# Patient Record
Sex: FEMALE | Race: ASIAN | NOT HISPANIC OR LATINO
[De-identification: names, ages, dates, MRNs, and addresses within clinical notes are randomized per-mention and may not be internally consistent; named-entity substitution may affect disease eponyms.]

---

## 2018-02-02 ENCOUNTER — RESULT REVIEW (OUTPATIENT)
Age: 33
End: 2018-02-02

## 2018-08-31 PROBLEM — Z00.00 ENCOUNTER FOR PREVENTIVE HEALTH EXAMINATION: Status: ACTIVE | Noted: 2018-08-31

## 2018-09-05 ENCOUNTER — TRANSCRIPTION ENCOUNTER (OUTPATIENT)
Age: 33
End: 2018-09-05

## 2018-09-05 ENCOUNTER — APPOINTMENT (OUTPATIENT)
Dept: MATERNAL FETAL MEDICINE | Facility: CLINIC | Age: 33
End: 2018-09-05
Payer: COMMERCIAL

## 2018-09-05 PROCEDURE — G0109 DIAB MANAGE TRN IND/GROUP: CPT

## 2018-09-10 ENCOUNTER — APPOINTMENT (OUTPATIENT)
Dept: MATERNAL FETAL MEDICINE | Facility: CLINIC | Age: 33
End: 2018-09-10
Payer: COMMERCIAL

## 2018-09-10 ENCOUNTER — ASOB RESULT (OUTPATIENT)
Age: 33
End: 2018-09-10

## 2018-09-10 DIAGNOSIS — O24.919 UNSPECIFIED DIABETES MELLITUS IN PREGNANCY, UNSPECIFIED TRIMESTER: ICD-10-CM

## 2018-09-10 PROCEDURE — G0109 DIAB MANAGE TRN IND/GROUP: CPT

## 2018-09-10 RX ORDER — LANCETS 30 GAUGE
EACH MISCELLANEOUS
Qty: 2 | Refills: 2 | Status: ACTIVE | COMMUNITY
Start: 2018-09-10 | End: 1900-01-01

## 2018-09-10 RX ORDER — BLOOD SUGAR DIAGNOSTIC
STRIP MISCELLANEOUS
Qty: 2 | Refills: 6 | Status: ACTIVE | COMMUNITY
Start: 2018-09-10 | End: 1900-01-01

## 2018-09-10 RX ORDER — URINE ACETONE TEST STRIPS
STRIP MISCELLANEOUS
Qty: 1 | Refills: 1 | Status: ACTIVE | COMMUNITY
Start: 2018-09-10 | End: 1900-01-01

## 2018-09-11 RX ORDER — BLOOD-GLUCOSE METER
KIT MISCELLANEOUS
Qty: 1 | Refills: 0 | Status: ACTIVE | COMMUNITY
Start: 2018-09-11 | End: 1900-01-01

## 2018-09-11 RX ORDER — LANCETS 28 GAUGE
EACH MISCELLANEOUS
Qty: 2 | Refills: 6 | Status: COMPLETED | COMMUNITY
Start: 2018-09-11 | End: 2019-09-11

## 2018-09-11 RX ORDER — BLOOD SUGAR DIAGNOSTIC
STRIP MISCELLANEOUS
Qty: 2 | Refills: 6 | Status: COMPLETED | COMMUNITY
Start: 2018-09-11 | End: 2019-09-11

## 2018-09-21 ENCOUNTER — APPOINTMENT (OUTPATIENT)
Dept: MATERNAL FETAL MEDICINE | Facility: CLINIC | Age: 33
End: 2018-09-21
Payer: COMMERCIAL

## 2018-09-21 ENCOUNTER — ASOB RESULT (OUTPATIENT)
Age: 33
End: 2018-09-21

## 2018-09-21 PROCEDURE — G0108 DIAB MANAGE TRN  PER INDIV: CPT

## 2018-09-28 ENCOUNTER — APPOINTMENT (OUTPATIENT)
Dept: MATERNAL FETAL MEDICINE | Facility: CLINIC | Age: 33
End: 2018-09-28
Payer: COMMERCIAL

## 2018-09-28 ENCOUNTER — ASOB RESULT (OUTPATIENT)
Age: 33
End: 2018-09-28

## 2018-09-28 PROCEDURE — G0108 DIAB MANAGE TRN  PER INDIV: CPT

## 2018-09-28 RX ORDER — ISOPROPYL ALCOHOL 0.7 ML/ML
SWAB TOPICAL
Qty: 1 | Refills: 2 | Status: ACTIVE | COMMUNITY
Start: 2018-09-28 | End: 1900-01-01

## 2018-09-29 RX ORDER — INSULIN HUMAN 100 [IU]/ML
100 INJECTION, SUSPENSION SUBCUTANEOUS
Qty: 1 | Refills: 1 | Status: ACTIVE | COMMUNITY
Start: 2018-09-28 | End: 1900-01-01

## 2018-09-29 RX ORDER — PEN NEEDLE, DIABETIC 29 G X1/2"
31G X 5 MM NEEDLE, DISPOSABLE MISCELLANEOUS
Qty: 1 | Refills: 0 | Status: ACTIVE | COMMUNITY
Start: 2018-09-28 | End: 1900-01-01

## 2018-10-02 ENCOUNTER — TRANSCRIPTION ENCOUNTER (OUTPATIENT)
Age: 33
End: 2018-10-02

## 2018-10-10 ENCOUNTER — APPOINTMENT (OUTPATIENT)
Dept: MATERNAL FETAL MEDICINE | Facility: CLINIC | Age: 33
End: 2018-10-10
Payer: COMMERCIAL

## 2018-10-10 ENCOUNTER — ASOB RESULT (OUTPATIENT)
Age: 33
End: 2018-10-10

## 2018-10-10 PROCEDURE — G0108 DIAB MANAGE TRN  PER INDIV: CPT

## 2018-10-26 ENCOUNTER — APPOINTMENT (OUTPATIENT)
Dept: MATERNAL FETAL MEDICINE | Facility: CLINIC | Age: 33
End: 2018-10-26
Payer: COMMERCIAL

## 2018-10-26 ENCOUNTER — ASOB RESULT (OUTPATIENT)
Age: 33
End: 2018-10-26

## 2018-10-26 PROCEDURE — G0108 DIAB MANAGE TRN  PER INDIV: CPT

## 2018-11-05 ENCOUNTER — OUTPATIENT (OUTPATIENT)
Dept: OUTPATIENT SERVICES | Facility: HOSPITAL | Age: 33
LOS: 1 days | End: 2018-11-05
Payer: COMMERCIAL

## 2018-11-05 DIAGNOSIS — O26.899 OTHER SPECIFIED PREGNANCY RELATED CONDITIONS, UNSPECIFIED TRIMESTER: ICD-10-CM

## 2018-11-05 DIAGNOSIS — Z3A.00 WEEKS OF GESTATION OF PREGNANCY NOT SPECIFIED: ICD-10-CM

## 2018-11-05 LAB — GLUCOSE BLDC GLUCOMTR-MCNC: 87 MG/DL — SIGNIFICANT CHANGE UP (ref 70–99)

## 2018-11-05 PROCEDURE — 82962 GLUCOSE BLOOD TEST: CPT

## 2018-11-05 PROCEDURE — G0463: CPT

## 2018-11-05 PROCEDURE — 59025 FETAL NON-STRESS TEST: CPT

## 2018-11-14 ENCOUNTER — INPATIENT (INPATIENT)
Facility: HOSPITAL | Age: 33
LOS: 3 days | Discharge: HOME | End: 2018-11-18
Attending: OBSTETRICS & GYNECOLOGY | Admitting: OBSTETRICS & GYNECOLOGY
Payer: COMMERCIAL

## 2018-11-14 ENCOUNTER — TRANSCRIPTION ENCOUNTER (OUTPATIENT)
Age: 33
End: 2018-11-14

## 2018-11-14 VITALS — WEIGHT: 169.76 LBS | HEIGHT: 60 IN

## 2018-11-14 DIAGNOSIS — Z79.4 LONG TERM (CURRENT) USE OF INSULIN: ICD-10-CM

## 2018-11-14 LAB
BASOPHILS # BLD AUTO: 0.1 K/UL — SIGNIFICANT CHANGE UP (ref 0–0.2)
BASOPHILS NFR BLD AUTO: 0.8 % — SIGNIFICANT CHANGE UP (ref 0–2)
EOSINOPHIL # BLD AUTO: 0.1 K/UL — SIGNIFICANT CHANGE UP (ref 0–0.5)
EOSINOPHIL NFR BLD AUTO: 0.8 % — SIGNIFICANT CHANGE UP (ref 0–6)
GLUCOSE BLDC GLUCOMTR-MCNC: 109 MG/DL — HIGH (ref 70–99)
HCT VFR BLD CALC: 38.2 % — SIGNIFICANT CHANGE UP (ref 34.5–45)
HGB BLD-MCNC: 12.5 G/DL — SIGNIFICANT CHANGE UP (ref 11.5–15.5)
LYMPHOCYTES # BLD AUTO: 1.8 K/UL — SIGNIFICANT CHANGE UP (ref 1–3.3)
LYMPHOCYTES # BLD AUTO: 21.7 % — SIGNIFICANT CHANGE UP (ref 13–44)
MCHC RBC-ENTMCNC: 23.7 PG — LOW (ref 27–34)
MCHC RBC-ENTMCNC: 32.6 GM/DL — SIGNIFICANT CHANGE UP (ref 32–36)
MCV RBC AUTO: 72.8 FL — LOW (ref 80–100)
MONOCYTES # BLD AUTO: 0.7 K/UL — SIGNIFICANT CHANGE UP (ref 0–0.9)
MONOCYTES NFR BLD AUTO: 8.7 % — SIGNIFICANT CHANGE UP (ref 2–14)
NEUTROPHILS # BLD AUTO: 5.6 K/UL — SIGNIFICANT CHANGE UP (ref 1.8–7.4)
NEUTROPHILS NFR BLD AUTO: 68 % — SIGNIFICANT CHANGE UP (ref 43–77)
PLATELET # BLD AUTO: 322 K/UL — SIGNIFICANT CHANGE UP (ref 150–400)
RBC # BLD: 5.25 M/UL — HIGH (ref 3.8–5.2)
RBC # FLD: 17.9 % — HIGH (ref 10.3–14.5)
WBC # BLD: 8.2 K/UL — SIGNIFICANT CHANGE UP (ref 3.8–10.5)
WBC # FLD AUTO: 8.2 K/UL — SIGNIFICANT CHANGE UP (ref 3.8–10.5)

## 2018-11-14 RX ORDER — SODIUM CHLORIDE 9 MG/ML
500 INJECTION, SOLUTION INTRAVENOUS ONCE
Qty: 0 | Refills: 0 | Status: DISCONTINUED | OUTPATIENT
Start: 2018-11-14 | End: 2018-11-15

## 2018-11-14 RX ORDER — CITRIC ACID/SODIUM CITRATE 300-500 MG
15 SOLUTION, ORAL ORAL EVERY 4 HOURS
Qty: 0 | Refills: 0 | Status: DISCONTINUED | OUTPATIENT
Start: 2018-11-14 | End: 2018-11-15

## 2018-11-14 RX ORDER — SODIUM CHLORIDE 9 MG/ML
1000 INJECTION INTRAMUSCULAR; INTRAVENOUS; SUBCUTANEOUS
Qty: 0 | Refills: 0 | Status: DISCONTINUED | OUTPATIENT
Start: 2018-11-14 | End: 2018-11-15

## 2018-11-14 RX ORDER — OXYTOCIN 10 UNIT/ML
333.33 VIAL (ML) INJECTION
Qty: 20 | Refills: 0 | Status: COMPLETED | OUTPATIENT
Start: 2018-11-14

## 2018-11-14 RX ORDER — SODIUM CHLORIDE 9 MG/ML
1000 INJECTION, SOLUTION INTRAVENOUS
Qty: 0 | Refills: 0 | Status: DISCONTINUED | OUTPATIENT
Start: 2018-11-14 | End: 2018-11-15

## 2018-11-14 RX ADMIN — SODIUM CHLORIDE 125 MILLILITER(S): 9 INJECTION INTRAMUSCULAR; INTRAVENOUS; SUBCUTANEOUS at 23:20

## 2018-11-15 ENCOUNTER — RESULT REVIEW (OUTPATIENT)
Age: 33
End: 2018-11-15

## 2018-11-15 LAB
BLD GP AB SCN SERPL QL: NEGATIVE — SIGNIFICANT CHANGE UP
GLUCOSE BLDC GLUCOMTR-MCNC: 118 MG/DL — HIGH (ref 70–99)
GLUCOSE BLDC GLUCOMTR-MCNC: 95 MG/DL — SIGNIFICANT CHANGE UP (ref 70–99)
RH IG SCN BLD-IMP: POSITIVE — SIGNIFICANT CHANGE UP
RH IG SCN BLD-IMP: POSITIVE — SIGNIFICANT CHANGE UP
T PALLIDUM AB TITR SER: NEGATIVE — SIGNIFICANT CHANGE UP

## 2018-11-15 PROCEDURE — 88307 TISSUE EXAM BY PATHOLOGIST: CPT | Mod: 26

## 2018-11-15 RX ORDER — SODIUM CHLORIDE 9 MG/ML
1000 INJECTION, SOLUTION INTRAVENOUS
Qty: 0 | Refills: 0 | Status: DISCONTINUED | OUTPATIENT
Start: 2018-11-15 | End: 2018-11-18

## 2018-11-15 RX ORDER — FERROUS SULFATE 325(65) MG
325 TABLET ORAL DAILY
Qty: 0 | Refills: 0 | Status: DISCONTINUED | OUTPATIENT
Start: 2018-11-15 | End: 2018-11-18

## 2018-11-15 RX ORDER — GLYCERIN ADULT
1 SUPPOSITORY, RECTAL RECTAL AT BEDTIME
Qty: 0 | Refills: 0 | Status: DISCONTINUED | OUTPATIENT
Start: 2018-11-15 | End: 2018-11-18

## 2018-11-15 RX ORDER — OXYCODONE HYDROCHLORIDE 5 MG/1
5 TABLET ORAL
Qty: 0 | Refills: 0 | Status: COMPLETED | OUTPATIENT
Start: 2018-11-15 | End: 2018-11-22

## 2018-11-15 RX ORDER — IBUPROFEN 200 MG
600 TABLET ORAL EVERY 6 HOURS
Qty: 0 | Refills: 0 | Status: COMPLETED | OUTPATIENT
Start: 2018-11-15 | End: 2019-10-14

## 2018-11-15 RX ORDER — OXYTOCIN 10 UNIT/ML
333.33 VIAL (ML) INJECTION
Qty: 20 | Refills: 0 | Status: DISCONTINUED | OUTPATIENT
Start: 2018-11-15 | End: 2018-11-18

## 2018-11-15 RX ORDER — KETOROLAC TROMETHAMINE 30 MG/ML
30 SYRINGE (ML) INJECTION EVERY 6 HOURS
Qty: 0 | Refills: 0 | Status: DISCONTINUED | OUTPATIENT
Start: 2018-11-15 | End: 2018-11-16

## 2018-11-15 RX ORDER — ACETAMINOPHEN 500 MG
975 TABLET ORAL EVERY 6 HOURS
Qty: 0 | Refills: 0 | Status: DISCONTINUED | OUTPATIENT
Start: 2018-11-15 | End: 2018-11-18

## 2018-11-15 RX ORDER — DEXAMETHASONE 0.5 MG/5ML
4 ELIXIR ORAL EVERY 6 HOURS
Qty: 0 | Refills: 0 | Status: DISCONTINUED | OUTPATIENT
Start: 2018-11-15 | End: 2018-11-17

## 2018-11-15 RX ORDER — LANOLIN
1 OINTMENT (GRAM) TOPICAL
Qty: 0 | Refills: 0 | Status: DISCONTINUED | OUTPATIENT
Start: 2018-11-15 | End: 2018-11-18

## 2018-11-15 RX ORDER — NALOXONE HYDROCHLORIDE 4 MG/.1ML
0.1 SPRAY NASAL
Qty: 0 | Refills: 0 | Status: DISCONTINUED | OUTPATIENT
Start: 2018-11-15 | End: 2018-11-17

## 2018-11-15 RX ORDER — DOCUSATE SODIUM 100 MG
100 CAPSULE ORAL
Qty: 0 | Refills: 0 | Status: DISCONTINUED | OUTPATIENT
Start: 2018-11-15 | End: 2018-11-18

## 2018-11-15 RX ORDER — SIMETHICONE 80 MG/1
80 TABLET, CHEWABLE ORAL EVERY 4 HOURS
Qty: 0 | Refills: 0 | Status: DISCONTINUED | OUTPATIENT
Start: 2018-11-15 | End: 2018-11-18

## 2018-11-15 RX ORDER — OXYTOCIN 10 UNIT/ML
41.67 VIAL (ML) INJECTION
Qty: 20 | Refills: 0 | Status: DISCONTINUED | OUTPATIENT
Start: 2018-11-15 | End: 2018-11-18

## 2018-11-15 RX ORDER — TETANUS TOXOID, REDUCED DIPHTHERIA TOXOID AND ACELLULAR PERTUSSIS VACCINE, ADSORBED 5; 2.5; 8; 8; 2.5 [IU]/.5ML; [IU]/.5ML; UG/.5ML; UG/.5ML; UG/.5ML
0.5 SUSPENSION INTRAMUSCULAR ONCE
Qty: 0 | Refills: 0 | Status: DISCONTINUED | OUTPATIENT
Start: 2018-11-15 | End: 2018-11-18

## 2018-11-15 RX ORDER — DIPHENHYDRAMINE HCL 50 MG
25 CAPSULE ORAL EVERY 6 HOURS
Qty: 0 | Refills: 0 | Status: DISCONTINUED | OUTPATIENT
Start: 2018-11-15 | End: 2018-11-18

## 2018-11-15 RX ORDER — OXYCODONE HYDROCHLORIDE 5 MG/1
5 TABLET ORAL EVERY 4 HOURS
Qty: 0 | Refills: 0 | Status: COMPLETED | OUTPATIENT
Start: 2018-11-15 | End: 2018-11-22

## 2018-11-15 RX ORDER — ONDANSETRON 8 MG/1
4 TABLET, FILM COATED ORAL EVERY 6 HOURS
Qty: 0 | Refills: 0 | Status: DISCONTINUED | OUTPATIENT
Start: 2018-11-15 | End: 2018-11-17

## 2018-11-15 RX ORDER — HEPARIN SODIUM 5000 [USP'U]/ML
5000 INJECTION INTRAVENOUS; SUBCUTANEOUS EVERY 12 HOURS
Qty: 0 | Refills: 0 | Status: DISCONTINUED | OUTPATIENT
Start: 2018-11-15 | End: 2018-11-18

## 2018-11-15 RX ADMIN — SODIUM CHLORIDE 125 MILLILITER(S): 9 INJECTION, SOLUTION INTRAVENOUS at 18:21

## 2018-11-15 RX ADMIN — Medication 30 MILLIGRAM(S): at 22:50

## 2018-11-15 RX ADMIN — SODIUM CHLORIDE 125 MILLILITER(S): 9 INJECTION, SOLUTION INTRAVENOUS at 05:27

## 2018-11-15 RX ADMIN — HEPARIN SODIUM 5000 UNIT(S): 5000 INJECTION INTRAVENOUS; SUBCUTANEOUS at 22:22

## 2018-11-15 RX ADMIN — Medication 15 MILLILITER(S): at 07:49

## 2018-11-15 RX ADMIN — Medication 30 MILLIGRAM(S): at 22:22

## 2018-11-16 LAB
HCT VFR BLD CALC: 32 % — LOW (ref 34.5–45)
HGB BLD-MCNC: 9.8 G/DL — LOW (ref 11.5–15.5)
MCHC RBC-ENTMCNC: 22.5 PG — LOW (ref 27–34)
MCHC RBC-ENTMCNC: 30.5 GM/DL — LOW (ref 32–36)
MCV RBC AUTO: 73.9 FL — LOW (ref 80–100)
PLATELET # BLD AUTO: 273 K/UL — SIGNIFICANT CHANGE UP (ref 150–400)
RBC # BLD: 4.33 M/UL — SIGNIFICANT CHANGE UP (ref 3.8–5.2)
RBC # FLD: 17.7 % — HIGH (ref 10.3–14.5)
WBC # BLD: 12.3 K/UL — HIGH (ref 3.8–10.5)
WBC # FLD AUTO: 12.3 K/UL — HIGH (ref 3.8–10.5)

## 2018-11-16 RX ORDER — KETOROLAC TROMETHAMINE 30 MG/ML
30 SYRINGE (ML) INJECTION EVERY 6 HOURS
Qty: 0 | Refills: 0 | Status: DISCONTINUED | OUTPATIENT
Start: 2018-11-16 | End: 2018-11-17

## 2018-11-16 RX ORDER — KETOROLAC TROMETHAMINE 30 MG/ML
30 SYRINGE (ML) INJECTION ONCE
Qty: 0 | Refills: 0 | Status: DISCONTINUED | OUTPATIENT
Start: 2018-11-16 | End: 2018-11-16

## 2018-11-16 RX ADMIN — Medication 30 MILLIGRAM(S): at 21:13

## 2018-11-16 RX ADMIN — SODIUM CHLORIDE 125 MILLILITER(S): 9 INJECTION, SOLUTION INTRAVENOUS at 18:31

## 2018-11-16 RX ADMIN — Medication 30 MILLIGRAM(S): at 21:20

## 2018-11-16 RX ADMIN — Medication 30 MILLIGRAM(S): at 03:59

## 2018-11-16 RX ADMIN — HEPARIN SODIUM 5000 UNIT(S): 5000 INJECTION INTRAVENOUS; SUBCUTANEOUS at 09:46

## 2018-11-16 RX ADMIN — HEPARIN SODIUM 5000 UNIT(S): 5000 INJECTION INTRAVENOUS; SUBCUTANEOUS at 21:13

## 2018-11-16 RX ADMIN — Medication 30 MILLIGRAM(S): at 09:50

## 2018-11-16 RX ADMIN — Medication 975 MILLIGRAM(S): at 21:20

## 2018-11-16 RX ADMIN — Medication 975 MILLIGRAM(S): at 20:58

## 2018-11-16 RX ADMIN — Medication 325 MILLIGRAM(S): at 09:50

## 2018-11-16 RX ADMIN — SODIUM CHLORIDE 125 MILLILITER(S): 9 INJECTION, SOLUTION INTRAVENOUS at 09:45

## 2018-11-16 RX ADMIN — Medication 30 MILLIGRAM(S): at 04:30

## 2018-11-16 RX ADMIN — Medication 30 MILLIGRAM(S): at 10:20

## 2018-11-16 RX ADMIN — Medication 1 TABLET(S): at 09:46

## 2018-11-16 NOTE — DIETITIAN INITIAL EVALUATION ADULT. - ADHERENCE
Pt followed a consistent CHO diet and took insulin at bedtime for glycemic control during pregnancy. Confirms food allergy to shellfish. Denies any GI distress./good

## 2018-11-16 NOTE — PROGRESS NOTE ADULT - SUBJECTIVE AND OBJECTIVE BOX
OB Progress Note:  Delivery, POD#1    S: 32yo POD#1 s/p LTCS . Her pain is well controlled. She is tolerating a regular diet and passing flatus. Denies N/V. Denies CP/SOB/lightheadedness/dizziness. She is ambulating without difficulty. Voiding spontanously.     O:   Vital Signs Last 24 Hrs  T(C): 36.9 (2018 06:31), Max: 37.3 (15 Nov 2018 21:21)  T(F): 98.5 (2018 06:31), Max: 99.1 (15 Nov 2018 21:21)  HR: 68 (2018 06:31) (68 - 86)  BP: 105/65 (2018 06:31) (95/56 - 131/64)  BP(mean): 92 (15 Nov 2018 15:50) (85 - 92)  RR: 18 (2018 06:31) (14 - 20)  SpO2: 96% (15 Nov 2018 21:21) (94% - 98%)    Labs:  Blood type: O Positive  Rubella IgG: RPR: Negative                          9.8<L>   12.3<H> >-----------< 273    ( 16 @ 06:49 )             32.0<L>                        12.5   8.2 >-----------< 322    ( 14 @ 23:46 )             38.2        PE:  General: NAD  Abdomen: Mildly distended, appropriately tender, incision c/d/i.  Extremities: No erythema, no pitting edema

## 2018-11-16 NOTE — PROGRESS NOTE ADULT - ATTENDING COMMENTS
I have seen and examined this patient.  I agree with the above assessment and plan.  Continue with current care.    Cristina Brothers MD

## 2018-11-16 NOTE — DIETITIAN INITIAL EVALUATION ADULT. - ENERGY NEEDS
ht: 60 inches, pre pregnancy wt: 140 pounds, prepregnancy BMI: 27.3 kg/m2, IBW: 100 pounds (+/- 10%)  Edema: none noted. Skin per nursing documentation: surgical incision noted.

## 2018-11-16 NOTE — PROGRESS NOTE ADULT - SUBJECTIVE AND OBJECTIVE BOX
Day 1 of Anesthesia Pain Management Service    SUBJECTIVE: I'm okay  Pain Scale Score:    [X] Refer to charted pain scores    THERAPY: Epidural Bupivacaine 0.01 % and Fentanyl 3 micrograms/mL     Demand Dose: 3 mL  Lockout: 15 minutes   Continuous Rate:  10 mL    MEDICATIONS  (STANDING):  acetaminophen   Tablet .. 975 milliGRAM(s) Oral every 6 hours  diphtheria/tetanus/pertussis (acellular) Vaccine (ADAcel) 0.5 milliLiter(s) IntraMuscular once  fentaNYL (3 MICROgram(s)/mL) + BUpivacaine 0.01% in 0.9% Sodium Chloride PCEA 250 milliLiter(s) Epidural PCA Continuous  ferrous    sulfate 325 milliGRAM(s) Oral daily  heparin  Injectable 5000 Unit(s) SubCutaneous every 12 hours  ibuprofen  Tablet. 600 milliGRAM(s) Oral every 6 hours  ketorolac   Injectable 30 milliGRAM(s) IV Push every 6 hours  ketorolac   Injectable 30 milliGRAM(s) IV Push once  lactated ringers. 1000 milliLiter(s) (125 mL/Hr) IV Continuous <Continuous>  misoprostol Oral Solution 60 MICROGram(s) Oral every 2 hours  oxyCODONE    IR 5 milliGRAM(s) Oral every 3 hours  oxytocin Infusion 333.333 milliUNIT(s)/Min (1000 mL/Hr) IV Continuous <Continuous>  oxytocin Infusion 41.667 milliUNIT(s)/Min (125 mL/Hr) IV Continuous <Continuous>  prenatal multivitamin 1 Tablet(s) Oral daily    MEDICATIONS  (PRN):  dexamethasone  Injectable 4 milliGRAM(s) IV Push every 6 hours PRN Nausea, IF ondansetron is ineffective after 30 - 60 minutes  diphenhydrAMINE 25 milliGRAM(s) Oral every 6 hours PRN Itching  docusate sodium 100 milliGRAM(s) Oral two times a day PRN Stool Softening  fentaNYL (3 MICROgram(s)/mL) + BUpivacaine 0.01% in 0.9% Sodium Chloride PCEA Rescue Clinician Bolus 5 milliLiter(s) Epidural every 15 minutes PRN Moderate Pain (4 - 6)  glycerin Suppository - Adult 1 Suppository(s) Rectal at bedtime PRN Constipation  lanolin Ointment 1 Application(s) Topical every 3 hours PRN Sore Nipples  naloxone Injectable 0.1 milliGRAM(s) IV Push every 3 minutes PRN For ANY of the following changes in patient status:  A. RR LESS THAN 10 breaths per minute, B. Oxygen saturation LESS THAN 90%, C. Sedation score of 6  ondansetron Injectable 4 milliGRAM(s) IV Push every 6 hours PRN Nausea  oxyCODONE    IR 5 milliGRAM(s) Oral every 4 hours PRN Severe Pain (7 - 10)  simethicone 80 milliGRAM(s) Chew every 4 hours PRN Gas      OBJECTIVE:    Assessment of Epidural Catheter Site: 	    [X] Dressing intact	[X] Site non-tender	[X] Site without erythema, discharge, edema  [X] Epidural tubing and connection checked	[X] Gross neurological exam within normal limits  [ ] Catheter removed – tip intact		                          9.8    12.3  )-----------( 273      ( 16 Nov 2018 06:49 )             32.0     Vital Signs Last 24 Hrs  T(C): 36.6 (11-16-18 @ 07:31), Max: 37.3 (11-15-18 @ 21:21)  T(F): 97.9 (11-16-18 @ 07:31), Max: 99.1 (11-15-18 @ 21:21)  HR: 69 (11-16-18 @ 07:31) (68 - 86)  BP: 112/74 (11-16-18 @ 07:31) (95/56 - 131/64)  BP(mean): 92 (11-15-18 @ 15:50) (85 - 92)  RR: 19 (11-16-18 @ 07:31) (14 - 20)  SpO2: 96% (11-16-18 @ 07:31) (94% - 98%)      Sedation Score:	[X] Alert	[ ] Drowsy	[ ] Arousable  [ ] Asleep  [ ] Unresponsive    Side Effects:	[X] None	[ ] Nausea	[ ] Vomiting  [ ] Pruritus  		[ ] Weakness  [ ] Numbness  [ ] Other:    ASSESSMENT/ PLAN:    Therapy:                         [X] Continue   [ ] Discontinue   [ ] Change to PRN Analgesics    Documentation and Verification of current medications:  [X] Done	[ ] Not done, not eligible, reason:    Comments:

## 2018-11-16 NOTE — DIETITIAN INITIAL EVALUATION ADULT. - OTHER INFO
Nutrition consult received for postpartum GDM. Pt is a 34 y/o  s/p cesarian delivery at 39 weeks gestation. Plans on breast and bottle feeding . Pt reports good appetite, denies any nausea/vomiting. No BM since delivery.

## 2018-11-17 ENCOUNTER — TRANSCRIPTION ENCOUNTER (OUTPATIENT)
Age: 33
End: 2018-11-17

## 2018-11-17 RX ORDER — IBUPROFEN 200 MG
1 TABLET ORAL
Qty: 0 | Refills: 0 | COMMUNITY
Start: 2018-11-17

## 2018-11-17 RX ORDER — OXYCODONE HYDROCHLORIDE 5 MG/1
5 TABLET ORAL
Qty: 0 | Refills: 0 | Status: DISCONTINUED | OUTPATIENT
Start: 2018-11-17 | End: 2018-11-18

## 2018-11-17 RX ORDER — IBUPROFEN 200 MG
600 TABLET ORAL EVERY 6 HOURS
Qty: 0 | Refills: 0 | Status: DISCONTINUED | OUTPATIENT
Start: 2018-11-17 | End: 2018-11-18

## 2018-11-17 RX ORDER — ACETAMINOPHEN 500 MG
3 TABLET ORAL
Qty: 0 | Refills: 0 | COMMUNITY
Start: 2018-11-17

## 2018-11-17 RX ORDER — DOCUSATE SODIUM 100 MG
1 CAPSULE ORAL
Qty: 0 | Refills: 0 | COMMUNITY
Start: 2018-11-17

## 2018-11-17 RX ORDER — FERROUS SULFATE 325(65) MG
1 TABLET ORAL
Qty: 90 | Refills: 1 | OUTPATIENT
Start: 2018-11-17 | End: 2019-01-15

## 2018-11-17 RX ORDER — OXYCODONE HYDROCHLORIDE 5 MG/1
1 TABLET ORAL
Qty: 30 | Refills: 0 | OUTPATIENT
Start: 2018-11-17

## 2018-11-17 RX ORDER — OXYCODONE HYDROCHLORIDE 5 MG/1
5 TABLET ORAL EVERY 4 HOURS
Qty: 0 | Refills: 0 | Status: DISCONTINUED | OUTPATIENT
Start: 2018-11-17 | End: 2018-11-18

## 2018-11-17 RX ADMIN — Medication 600 MILLIGRAM(S): at 18:21

## 2018-11-17 RX ADMIN — Medication 975 MILLIGRAM(S): at 21:48

## 2018-11-17 RX ADMIN — HEPARIN SODIUM 5000 UNIT(S): 5000 INJECTION INTRAVENOUS; SUBCUTANEOUS at 21:48

## 2018-11-17 RX ADMIN — Medication 600 MILLIGRAM(S): at 12:45

## 2018-11-17 RX ADMIN — Medication 975 MILLIGRAM(S): at 10:05

## 2018-11-17 RX ADMIN — Medication 975 MILLIGRAM(S): at 03:24

## 2018-11-17 RX ADMIN — Medication 975 MILLIGRAM(S): at 16:26

## 2018-11-17 RX ADMIN — Medication 975 MILLIGRAM(S): at 03:50

## 2018-11-17 RX ADMIN — Medication 600 MILLIGRAM(S): at 12:14

## 2018-11-17 RX ADMIN — Medication 975 MILLIGRAM(S): at 09:36

## 2018-11-17 RX ADMIN — Medication 100 MILLIGRAM(S): at 21:48

## 2018-11-17 RX ADMIN — Medication 1 TABLET(S): at 12:14

## 2018-11-17 RX ADMIN — Medication 30 MILLIGRAM(S): at 03:23

## 2018-11-17 RX ADMIN — Medication 325 MILLIGRAM(S): at 12:14

## 2018-11-17 RX ADMIN — Medication 975 MILLIGRAM(S): at 15:55

## 2018-11-17 RX ADMIN — Medication 30 MILLIGRAM(S): at 03:50

## 2018-11-17 RX ADMIN — HEPARIN SODIUM 5000 UNIT(S): 5000 INJECTION INTRAVENOUS; SUBCUTANEOUS at 09:37

## 2018-11-17 RX ADMIN — Medication 600 MILLIGRAM(S): at 18:50

## 2018-11-17 RX ADMIN — Medication 975 MILLIGRAM(S): at 22:20

## 2018-11-17 NOTE — DISCHARGE NOTE OB - MEDICATION SUMMARY - MEDICATIONS TO STOP TAKING
I will STOP taking the medications listed below when I get home from the hospital:    HumuLIN N KwikPen 100 units/mL subcutaneous suspension  -- 10 unit(s) subcutaneous once a day (at bedtime)   -- Do not drink alcoholic beverages when taking this medication.  It is very important that you take or use this exactly as directed.  Do not skip doses or discontinue unless directed by your doctor.  Keep in refrigerator.  Do not freeze.

## 2018-11-17 NOTE — PROGRESS NOTE ADULT - PROBLEM SELECTOR PLAN 1
- Continue regular diet.  - Increase ambulation.  - D/c PCEA today and transition to PO pain medication with motrin, tylenol and oxycodone PRN.

## 2018-11-17 NOTE — DISCHARGE NOTE OB - PATIENT PORTAL LINK FT
You can access the [facility] Patient Portal, offered by [facility], by registering with the following website: [email or web address]

## 2018-11-17 NOTE — PROGRESS NOTE ADULT - ATTENDING COMMENTS
Pt seen and examined by me. Meeting all POD 2 milestones. VSS and exam wnl.  If pt remains stable will d/c home tomorrow.     RESHMA Grimes MD  [phone]

## 2018-11-17 NOTE — DISCHARGE NOTE OB - MEDICATION SUMMARY - MEDICATIONS TO TAKE
I will START or STAY ON the medications listed below when I get home from the hospital:    ibuprofen 600 mg oral tablet  -- 1 tab(s) by mouth every 6 hours  -- Indication: For  delivery delivered    acetaminophen 325 mg oral tablet  -- 3 tab(s) by mouth every 6 hours  -- Indication: For  delivery delivered    oxyCODONE 5 mg oral tablet  -- 1 tab(s) by mouth every 3 hours, As Needed -for severe pain MDD:6 tabs   -- Indication: For  delivery delivered    Prenatal Multivitamins with Folic Acid 1 mg oral tablet  -- 1 tab(s) by mouth once a day  -- Indication: For  delivery delivered    FeroSul 325 mg (65 mg elemental iron) oral tablet  -- 1 tab(s) by mouth 3 times a day  -- Indication: For  delivery delivered    docusate sodium 100 mg oral capsule  -- 1 cap(s) by mouth 2 times a day, As needed, Stool Softening  -- Indication: For  delivery delivered

## 2018-11-17 NOTE — PROGRESS NOTE ADULT - SUBJECTIVE AND OBJECTIVE BOX
Day __2_ of Anesthesia Pain Management Service    SUBJECTIVE:  Pain Scale Score	At rest: ___ 	With Activity: ___ 	[x  ] Refer to charted pain scores    THERAPY:  [ ] Epidural Bupivacaine 0.0625% and Hydromorphone 10 micrograms/mL  [ ] Epidural Ropivacaine 0.2% plain   [x ] Epidural Bupivacaine 0.01 % and Fentanyl 3 micrograms/mL  (OB)    Demand dose _3ml__ lockout _15__ (minutes) Continuous Rate _10__       MEDICATIONS  (STANDING):  acetaminophen   Tablet .. 975 milliGRAM(s) Oral every 6 hours  diphtheria/tetanus/pertussis (acellular) Vaccine (ADAcel) 0.5 milliLiter(s) IntraMuscular once  fentaNYL (3 MICROgram(s)/mL) + BUpivacaine 0.01% in 0.9% Sodium Chloride PCEA 250 milliLiter(s) Epidural PCA Continuous  ferrous    sulfate 325 milliGRAM(s) Oral daily  heparin  Injectable 5000 Unit(s) SubCutaneous every 12 hours  ibuprofen  Tablet. 600 milliGRAM(s) Oral every 6 hours  ketorolac   Injectable 30 milliGRAM(s) IV Push every 6 hours  lactated ringers. 1000 milliLiter(s) (125 mL/Hr) IV Continuous <Continuous>  misoprostol Oral Solution 60 MICROGram(s) Oral every 2 hours  oxyCODONE    IR 5 milliGRAM(s) Oral every 3 hours  oxytocin Infusion 333.333 milliUNIT(s)/Min (1000 mL/Hr) IV Continuous <Continuous>  oxytocin Infusion 41.667 milliUNIT(s)/Min (125 mL/Hr) IV Continuous <Continuous>  prenatal multivitamin 1 Tablet(s) Oral daily    MEDICATIONS  (PRN):  dexamethasone  Injectable 4 milliGRAM(s) IV Push every 6 hours PRN Nausea, IF ondansetron is ineffective after 30 - 60 minutes  diphenhydrAMINE 25 milliGRAM(s) Oral every 6 hours PRN Itching  docusate sodium 100 milliGRAM(s) Oral two times a day PRN Stool Softening  fentaNYL (3 MICROgram(s)/mL) + BUpivacaine 0.01% in 0.9% Sodium Chloride PCEA Rescue Clinician Bolus 5 milliLiter(s) Epidural every 15 minutes PRN Moderate Pain (4 - 6)  glycerin Suppository - Adult 1 Suppository(s) Rectal at bedtime PRN Constipation  lanolin Ointment 1 Application(s) Topical every 3 hours PRN Sore Nipples  naloxone Injectable 0.1 milliGRAM(s) IV Push every 3 minutes PRN For ANY of the following changes in patient status:  A. RR LESS THAN 10 breaths per minute, B. Oxygen saturation LESS THAN 90%, C. Sedation score of 6  ondansetron Injectable 4 milliGRAM(s) IV Push every 6 hours PRN Nausea  oxyCODONE    IR 5 milliGRAM(s) Oral every 4 hours PRN Severe Pain (7 - 10)  simethicone 80 milliGRAM(s) Chew every 4 hours PRN Gas      OBJECTIVE:    Assessment of Epidural Catheter Site: 	    [ ] Dressing intact	[ x] Site non-tender	[x ] Site without erythema, discharge, edema  [ ] Epidural tubing and connection checked	[ x[ Gross neurological exam within normal limits  [x ] Catheter removed – tip intact		                          9.8    12.3  )-----------( 273      ( 16 Nov 2018 06:49 )             32.0     Vital Signs Last 24 Hrs  T(C): 36.9 (11-17-18 @ 05:24), Max: 37.3 (11-16-18 @ 17:15)  T(F): 98.5 (11-17-18 @ 05:24), Max: 99.2 (11-16-18 @ 17:15)  HR: 76 (11-17-18 @ 05:24) (72 - 94)  BP: 112/66 (11-17-18 @ 05:24) (109/72 - 115/72)  BP(mean): --  RR: 18 (11-17-18 @ 05:24) (18 - 18)  SpO2: 97% (11-17-18 @ 05:24) (95% - 97%)      Sedation Score:	[x ] Alert	[ ] Drowsy	[ ] Arousable  [ ] Asleep  [ ] Unresponsive    Side Effects:	[ x] None	[ ] Nausea	[ ] Vomiting  [ ] Pruritus  		[ ] Weakness  [ ] Numbness  [ ] Other:    ASSESSMENT/ PLAN:    Therapy to  be:	[ ] Continue   [ x] Discontinued   [x ] Change to prn Analgesics    Documentation and Verification of current medications:  [ X ] Done	[ ] Not done, not eligible, reason:    Comments:I was physically present for the key portions of the evaluation and management service provided. I agree with the above history, physical, and plan which I ahve reviewed and edited where appropriate

## 2018-11-17 NOTE — PROGRESS NOTE ADULT - SUBJECTIVE AND OBJECTIVE BOX
OB Progress Note:  delivery, POD#2    S: 32yo POD#2 s/p LTCS. Pain is well controlled. She is tolerating a regular diet and passing flatus. She is voiding spontaneously, and ambulating without difficulty. Denies CP/SOB. Denies lightheadedness/dizziness. Denies N/V.    O:  Vitals:  Vital Signs Last 24 Hrs  T(C): 36.9 (2018 05:24), Max: 37.3 (2018 17:15)  T(F): 98.5 (2018 05:24), Max: 99.2 (2018 17:15)  HR: 76 (2018 05:24) (72 - 94)  BP: 112/66 (2018 05:24) (109/72 - 115/72)  BP(mean): --  RR: 18 (2018 05:24) (18 - 18)  SpO2: 97% (2018 05:24) (95% - 97%)    MEDICATIONS  (STANDING):  acetaminophen   Tablet .. 975 milliGRAM(s) Oral every 6 hours  diphtheria/tetanus/pertussis (acellular) Vaccine (ADAcel) 0.5 milliLiter(s) IntraMuscular once  ferrous    sulfate 325 milliGRAM(s) Oral daily  heparin  Injectable 5000 Unit(s) SubCutaneous every 12 hours  ibuprofen  Tablet. 600 milliGRAM(s) Oral every 6 hours  ketorolac   Injectable 30 milliGRAM(s) IV Push every 6 hours  lactated ringers. 1000 milliLiter(s) (125 mL/Hr) IV Continuous <Continuous>  misoprostol Oral Solution 60 MICROGram(s) Oral every 2 hours  oxyCODONE    IR 5 milliGRAM(s) Oral every 3 hours  oxytocin Infusion 333.333 milliUNIT(s)/Min (1000 mL/Hr) IV Continuous <Continuous>  oxytocin Infusion 41.667 milliUNIT(s)/Min (125 mL/Hr) IV Continuous <Continuous>  prenatal multivitamin 1 Tablet(s) Oral daily      MEDICATIONS  (PRN):  diphenhydrAMINE 25 milliGRAM(s) Oral every 6 hours PRN Itching  docusate sodium 100 milliGRAM(s) Oral two times a day PRN Stool Softening  glycerin Suppository - Adult 1 Suppository(s) Rectal at bedtime PRN Constipation  lanolin Ointment 1 Application(s) Topical every 3 hours PRN Sore Nipples  oxyCODONE    IR 5 milliGRAM(s) Oral every 4 hours PRN Severe Pain (7 - 10)  simethicone 80 milliGRAM(s) Chew every 4 hours PRN Gas      Labs:  Blood type: O Positive  Rubella IgG: RPR: Negative                          9.8<L>   12.3<H> >-----------< 273    (  @ 06:49 )             32.0<L>                        12.5   8.2 >-----------< 322    (  @ 23:46 )             38.2          PE:  General: NAD  Abdomen: Soft, appropriately tender, incision c/d/i.  Extremities: No erythema, no pitting edema

## 2018-11-17 NOTE — DISCHARGE NOTE OB - CARE PLAN
Principal Discharge DX:	 delivery delivered  Goal:	Routine post partum care  Assessment and plan of treatment:	Strict precautions reviewed at bedside

## 2018-11-17 NOTE — DISCHARGE NOTE OB - HOSPITAL COURSE
Pt had failed IOL for GDM complicated by abruption. PCS uncomplicated and PP care routine. Stable for d/c home on POD 3.     RESHMA Grimes MD

## 2018-11-17 NOTE — DISCHARGE NOTE OB - CARE PROVIDER_API CALL
Noreen Grimes (MD), [facility] Obstetrics  Gynecology  [address] [place] [address]  Phone: [phone]  Fax: [phone]

## 2018-11-18 VITALS
HEART RATE: 71 BPM | TEMPERATURE: 98 F | DIASTOLIC BLOOD PRESSURE: 82 MMHG | SYSTOLIC BLOOD PRESSURE: 123 MMHG | RESPIRATION RATE: 18 BRPM

## 2018-11-18 LAB
ALBUMIN SERPL ELPH-MCNC: 2.9 G/DL — LOW (ref 3.3–5)
ALP SERPL-CCNC: 107 U/L — SIGNIFICANT CHANGE UP (ref 40–120)
ALT FLD-CCNC: 32 U/L — SIGNIFICANT CHANGE UP (ref 10–45)
ANION GAP SERPL CALC-SCNC: 11 MMOL/L — SIGNIFICANT CHANGE UP (ref 5–17)
APTT BLD: 30 SEC — SIGNIFICANT CHANGE UP (ref 27.5–36.3)
AST SERPL-CCNC: 32 U/L — SIGNIFICANT CHANGE UP (ref 10–40)
BILIRUB SERPL-MCNC: 0.2 MG/DL — SIGNIFICANT CHANGE UP (ref 0.2–1.2)
BUN SERPL-MCNC: 10 MG/DL — SIGNIFICANT CHANGE UP (ref 7–23)
CALCIUM SERPL-MCNC: 8.8 MG/DL — SIGNIFICANT CHANGE UP (ref 8.4–10.5)
CHLORIDE SERPL-SCNC: 105 MMOL/L — SIGNIFICANT CHANGE UP (ref 96–108)
CO2 SERPL-SCNC: 23 MMOL/L — SIGNIFICANT CHANGE UP (ref 22–31)
CREAT SERPL-MCNC: 0.73 MG/DL — SIGNIFICANT CHANGE UP (ref 0.5–1.3)
FIBRINOGEN PPP-MCNC: 796 MG/DL — HIGH (ref 350–510)
GLUCOSE SERPL-MCNC: 118 MG/DL — HIGH (ref 70–99)
HCT VFR BLD CALC: 31.6 % — LOW (ref 34.5–45)
HCT VFR BLD CALC: 33.7 % — LOW (ref 34.5–45)
HGB BLD-MCNC: 10.1 G/DL — LOW (ref 11.5–15.5)
HGB BLD-MCNC: 10.7 G/DL — LOW (ref 11.5–15.5)
INR BLD: 0.85 RATIO — LOW (ref 0.88–1.16)
LDH SERPL L TO P-CCNC: 213 U/L — SIGNIFICANT CHANGE UP (ref 50–242)
MCHC RBC-ENTMCNC: 23.4 PG — LOW (ref 27–34)
MCHC RBC-ENTMCNC: 23.6 PG — LOW (ref 27–34)
MCHC RBC-ENTMCNC: 31.8 GM/DL — LOW (ref 32–36)
MCHC RBC-ENTMCNC: 32 GM/DL — SIGNIFICANT CHANGE UP (ref 32–36)
MCV RBC AUTO: 73.3 FL — LOW (ref 80–100)
MCV RBC AUTO: 74.2 FL — LOW (ref 80–100)
PLATELET # BLD AUTO: 296 K/UL — SIGNIFICANT CHANGE UP (ref 150–400)
PLATELET # BLD AUTO: 338 K/UL — SIGNIFICANT CHANGE UP (ref 150–400)
POTASSIUM SERPL-MCNC: 3.7 MMOL/L — SIGNIFICANT CHANGE UP (ref 3.5–5.3)
POTASSIUM SERPL-SCNC: 3.7 MMOL/L — SIGNIFICANT CHANGE UP (ref 3.5–5.3)
PROT SERPL-MCNC: 6.1 G/DL — SIGNIFICANT CHANGE UP (ref 6–8.3)
PROTHROM AB SERPL-ACNC: 9.7 SEC — LOW (ref 10–12.9)
RBC # BLD: 4.31 M/UL — SIGNIFICANT CHANGE UP (ref 3.8–5.2)
RBC # BLD: 4.54 M/UL — SIGNIFICANT CHANGE UP (ref 3.8–5.2)
RBC # FLD: 17.9 % — HIGH (ref 10.3–14.5)
RBC # FLD: 18.6 % — HIGH (ref 10.3–14.5)
SODIUM SERPL-SCNC: 139 MMOL/L — SIGNIFICANT CHANGE UP (ref 135–145)
URATE SERPL-MCNC: 5.7 MG/DL — SIGNIFICANT CHANGE UP (ref 2.5–7)
WBC # BLD: 6.55 K/UL — SIGNIFICANT CHANGE UP (ref 3.8–10.5)
WBC # BLD: 6.9 K/UL — SIGNIFICANT CHANGE UP (ref 3.8–10.5)
WBC # FLD AUTO: 6.55 K/UL — SIGNIFICANT CHANGE UP (ref 3.8–10.5)
WBC # FLD AUTO: 6.9 K/UL — SIGNIFICANT CHANGE UP (ref 3.8–10.5)

## 2018-11-18 PROCEDURE — 59050 FETAL MONITOR W/REPORT: CPT

## 2018-11-18 PROCEDURE — 85610 PROTHROMBIN TIME: CPT

## 2018-11-18 PROCEDURE — 80053 COMPREHEN METABOLIC PANEL: CPT

## 2018-11-18 PROCEDURE — 85730 THROMBOPLASTIN TIME PARTIAL: CPT

## 2018-11-18 PROCEDURE — 83615 LACTATE (LD) (LDH) ENZYME: CPT

## 2018-11-18 PROCEDURE — 85384 FIBRINOGEN ACTIVITY: CPT

## 2018-11-18 PROCEDURE — 86900 BLOOD TYPING SEROLOGIC ABO: CPT

## 2018-11-18 PROCEDURE — 88307 TISSUE EXAM BY PATHOLOGIST: CPT

## 2018-11-18 PROCEDURE — 85027 COMPLETE CBC AUTOMATED: CPT

## 2018-11-18 PROCEDURE — 86850 RBC ANTIBODY SCREEN: CPT

## 2018-11-18 PROCEDURE — 86901 BLOOD TYPING SEROLOGIC RH(D): CPT

## 2018-11-18 PROCEDURE — 86780 TREPONEMA PALLIDUM: CPT

## 2018-11-18 PROCEDURE — 82962 GLUCOSE BLOOD TEST: CPT

## 2018-11-18 PROCEDURE — 84550 ASSAY OF BLOOD/URIC ACID: CPT

## 2018-11-18 PROCEDURE — 59025 FETAL NON-STRESS TEST: CPT

## 2018-11-18 RX ADMIN — HEPARIN SODIUM 5000 UNIT(S): 5000 INJECTION INTRAVENOUS; SUBCUTANEOUS at 11:09

## 2018-11-18 RX ADMIN — Medication 600 MILLIGRAM(S): at 00:50

## 2018-11-18 RX ADMIN — OXYCODONE HYDROCHLORIDE 5 MILLIGRAM(S): 5 TABLET ORAL at 00:18

## 2018-11-18 RX ADMIN — Medication 600 MILLIGRAM(S): at 05:57

## 2018-11-18 RX ADMIN — Medication 600 MILLIGRAM(S): at 00:18

## 2018-11-18 RX ADMIN — Medication 975 MILLIGRAM(S): at 11:08

## 2018-11-18 RX ADMIN — OXYCODONE HYDROCHLORIDE 5 MILLIGRAM(S): 5 TABLET ORAL at 05:57

## 2018-11-18 RX ADMIN — Medication 975 MILLIGRAM(S): at 03:12

## 2018-11-18 RX ADMIN — OXYCODONE HYDROCHLORIDE 5 MILLIGRAM(S): 5 TABLET ORAL at 03:40

## 2018-11-18 RX ADMIN — OXYCODONE HYDROCHLORIDE 5 MILLIGRAM(S): 5 TABLET ORAL at 00:50

## 2018-11-18 RX ADMIN — Medication 100 MILLIGRAM(S): at 11:09

## 2018-11-18 RX ADMIN — Medication 975 MILLIGRAM(S): at 03:40

## 2018-11-18 RX ADMIN — OXYCODONE HYDROCHLORIDE 5 MILLIGRAM(S): 5 TABLET ORAL at 11:40

## 2018-11-18 RX ADMIN — Medication 600 MILLIGRAM(S): at 12:06

## 2018-11-18 RX ADMIN — Medication 975 MILLIGRAM(S): at 11:40

## 2018-11-18 RX ADMIN — Medication 325 MILLIGRAM(S): at 12:06

## 2018-11-18 RX ADMIN — Medication 600 MILLIGRAM(S): at 06:25

## 2018-11-18 RX ADMIN — OXYCODONE HYDROCHLORIDE 5 MILLIGRAM(S): 5 TABLET ORAL at 06:25

## 2018-11-18 RX ADMIN — OXYCODONE HYDROCHLORIDE 5 MILLIGRAM(S): 5 TABLET ORAL at 11:08

## 2018-11-18 RX ADMIN — OXYCODONE HYDROCHLORIDE 5 MILLIGRAM(S): 5 TABLET ORAL at 03:13

## 2018-11-18 RX ADMIN — Medication 1 TABLET(S): at 12:09

## 2018-11-18 RX ADMIN — Medication 600 MILLIGRAM(S): at 12:40

## 2018-11-18 RX ADMIN — Medication 100 MILLIGRAM(S): at 05:56

## 2018-11-18 NOTE — PROVIDER CONTACT NOTE (OTHER) - ASSESSMENT
pt bp electronically 148/86 and manually 145/81 and complaining of ringing in the ear, shortness of breath and tingling in the legs. all other vitlas wdl.

## 2018-11-18 NOTE — PROVIDER CONTACT NOTE (OTHER) - SITUATION
pt having elevated bp and complaining of ringing in the ear, shortness of breath and tingling in the legs.

## 2018-11-18 NOTE — PROGRESS NOTE ADULT - SUBJECTIVE AND OBJECTIVE BOX
OB Postpartum Note:   Delivery, POD#3    S: 34yo POD#3 s/p LTCS. The patient feels well.  Pain is well controlled. She is tolerating a regular diet and passing flatus. She is voiding spontaneously, and ambulating without difficulty. Denies CP/SOB. Denies lightheadedness/dizziness. Denies N/V.    O:  Vitals:  Vital Signs Last 24 Hrs  T(C): 36.9 (2018 21:59), Max: 36.9 (2018 05:24)  T(F): 98.5 (2018 21:59), Max: 98.5 (2018 05:24)  HR: 96 (2018 21:59) (75 - 96)  BP: 121/80 (2018 21:59) (112/66 - 121/80)  BP(mean): --  RR: 18 (2018 21:59) (18 - 18)  SpO2: 98% (2018 21:59) (97% - 98%)    MEDICATIONS  (STANDING):  acetaminophen   Tablet .. 975 milliGRAM(s) Oral every 6 hours  diphtheria/tetanus/pertussis (acellular) Vaccine (ADAcel) 0.5 milliLiter(s) IntraMuscular once  ferrous    sulfate 325 milliGRAM(s) Oral daily  heparin  Injectable 5000 Unit(s) SubCutaneous every 12 hours  ibuprofen  Tablet. 600 milliGRAM(s) Oral every 6 hours  lactated ringers. 1000 milliLiter(s) (125 mL/Hr) IV Continuous <Continuous>  misoprostol Oral Solution 60 MICROGram(s) Oral every 2 hours  oxyCODONE    IR 5 milliGRAM(s) Oral every 3 hours  oxytocin Infusion 333.333 milliUNIT(s)/Min (1000 mL/Hr) IV Continuous <Continuous>  oxytocin Infusion 41.667 milliUNIT(s)/Min (125 mL/Hr) IV Continuous <Continuous>  prenatal multivitamin 1 Tablet(s) Oral daily    MEDICATIONS  (PRN):  diphenhydrAMINE 25 milliGRAM(s) Oral every 6 hours PRN Itching  docusate sodium 100 milliGRAM(s) Oral two times a day PRN Stool Softening  glycerin Suppository - Adult 1 Suppository(s) Rectal at bedtime PRN Constipation  lanolin Ointment 1 Application(s) Topical every 3 hours PRN Sore Nipples  oxyCODONE    IR 5 milliGRAM(s) Oral every 4 hours PRN Severe Pain (7 - 10)  simethicone 80 milliGRAM(s) Chew every 4 hours PRN Gas      LABS:  Blood type: O Positive  Rubella IgG: RPR: Negative                          9.8<L>   12.3<H> >-----------< 273    ( 11-16 @ 06:49 )             32.0<L>                  Physical exam:  Gen: NAD  Abdomen: Soft, nontender, no distension , firm uterine fundus at umbilicus.  Incision: Clean, dry, and intact   Pelvic: Normal lochia noted  Ext: No calf tenderness

## 2018-11-18 NOTE — PROGRESS NOTE ADULT - PROBLEM SELECTOR PLAN 1
- Continue motrin, tylenol, oxycodone PRN for pain control.  - Increase ambulation  - Continue regular diet  - Discharge planning  Charlotte Woodson PGY-1

## 2018-11-18 NOTE — PROGRESS NOTE ADULT - ATTENDING COMMENTS
Pt had elevated BPs this AM -- ? Pain related. NO PEC sxsn today. Otherwise meeting all post op milestones. Exam wnl. Labs sent to r/o PEC. Will hold on d/c until late afternoon potentially tomorrow if BPs still elevated. Will treat with mag and PO meds if BPs become severe range or mag alone if she develops severe sxs.     RESHMA Grimes MD   [phone]

## 2019-01-10 ENCOUNTER — RESULT REVIEW (OUTPATIENT)
Age: 34
End: 2019-01-10

## 2019-03-07 ENCOUNTER — TRANSCRIPTION ENCOUNTER (OUTPATIENT)
Age: 34
End: 2019-03-07

## 2019-08-02 ENCOUNTER — APPOINTMENT (OUTPATIENT)
Dept: HUMAN REPRODUCTION | Facility: CLINIC | Age: 34
End: 2019-08-02

## 2019-11-13 ENCOUNTER — APPOINTMENT (OUTPATIENT)
Dept: ENDOCRINOLOGY | Facility: CLINIC | Age: 34
End: 2019-11-13

## 2019-11-22 ENCOUNTER — RESULT REVIEW (OUTPATIENT)
Age: 34
End: 2019-11-22

## 2019-12-26 ENCOUNTER — APPOINTMENT (OUTPATIENT)
Dept: ENDOCRINOLOGY | Facility: CLINIC | Age: 34
End: 2019-12-26

## 2020-01-18 NOTE — ANESTHESIA FOLLOW-UP NOTE - NSINTERVIEW_GEN_ALL_CORE
Interviewed and evaluated
EOS calculated successfully. EOS Risk Factor: 0.5/1000 live births ([facility] national incidence); GA=39w3d; Temp=98.6; ROM=0; GBS='Negative'; Antibiotics='No antibiotics or any antibiotics < 2 hrs prior to birth'

## 2020-04-15 ENCOUNTER — APPOINTMENT (OUTPATIENT)
Dept: DISASTER EMERGENCY | Facility: CLINIC | Age: 35
End: 2020-04-15

## 2020-04-26 ENCOUNTER — MESSAGE (OUTPATIENT)
Age: 35
End: 2020-04-26

## 2020-05-08 ENCOUNTER — APPOINTMENT (OUTPATIENT)
Dept: DISASTER EMERGENCY | Facility: HOSPITAL | Age: 35
End: 2020-05-08

## 2020-05-08 LAB
SARS-COV-2 IGG SERPL IA-ACNC: 60.6 INDEX
SARS-COV-2 IGG SERPL QL IA: POSITIVE

## 2020-09-11 ENCOUNTER — RESULT REVIEW (OUTPATIENT)
Age: 35
End: 2020-09-11

## 2021-11-03 ENCOUNTER — APPOINTMENT (OUTPATIENT)
Dept: OBGYN | Facility: CLINIC | Age: 36
End: 2021-11-03
Payer: COMMERCIAL

## 2021-11-03 VITALS
WEIGHT: 152 LBS | DIASTOLIC BLOOD PRESSURE: 70 MMHG | SYSTOLIC BLOOD PRESSURE: 108 MMHG | BODY MASS INDEX: 29.84 KG/M2 | HEIGHT: 60 IN

## 2021-11-03 PROCEDURE — 99395 PREV VISIT EST AGE 18-39: CPT

## 2021-11-03 PROCEDURE — 36415 COLL VENOUS BLD VENIPUNCTURE: CPT

## 2021-11-03 RX ORDER — METFORMIN HYDROCHLORIDE 625 MG/1
TABLET ORAL
Refills: 0 | Status: ACTIVE | COMMUNITY

## 2021-11-03 RX ORDER — NORETHINDRONE 0.35 MG/1
TABLET ORAL
Refills: 0 | Status: ACTIVE | COMMUNITY

## 2021-11-03 NOTE — PHYSICAL EXAM
[Chaperone Present] : A chaperone was present in the examining room during all aspects of the physical examination [Appropriately responsive] : appropriately responsive [Alert] : alert [No Acute Distress] : no acute distress [No Lymphadenopathy] : no lymphadenopathy [Regular Rate Rhythm] : regular rate rhythm [No Murmurs] : no murmurs [Clear to Auscultation B/L] : clear to auscultation bilaterally [Soft] : soft [Non-tender] : non-tender [Non-distended] : non-distended [No HSM] : No HSM [No Lesions] : no lesions [No Mass] : no mass [Oriented x3] : oriented x3 [Examination Of The Breasts] : a normal appearance [Breast Palpation Diffuse Fibrous Tissue Bilateral] : fibrocystic changes [No Masses] : no breast masses were palpable [Labia Majora] : normal [Labia Minora] : normal [Normal] : normal [Uterine Adnexae] : normal

## 2021-11-03 NOTE — PLAN
[FreeTextEntry1] : #HCM\par -Breast self exam reviewed and taught\par -STI Screening today with GC/CT cultures, HIV, RPR, Hep B/C\par -Pap/HPV today\par -Immunizations: UTD\par \par #contraception\par -renew micronor\par -discussed LARCS, pt declines at this time \par \par RTO in 1 year for annual GYN exam or PRN for any GYN complaints\par DAE Marquez MD\par

## 2021-11-03 NOTE — HISTORY OF PRESENT ILLNESS
[Normal Amount/Duration] :  normal amount and duration [Regular Cycle Intervals] : periods have been regular [Menarche Age: ____] : age at menarche was [unfilled] [FreeTextEntry1] : 10/26/21 [Currently Active] : currently active [Men] : men [Vaginal] : vaginal [No] : No [Patient would like to be screened for STIs] : Patient would like to be screened for STIs [FreeTextEntry3] : micronor

## 2021-11-04 LAB
HIV1+2 AB SPEC QL IA.RAPID: NONREACTIVE
T PALLIDUM AB SER QL IA: NEGATIVE

## 2021-11-09 LAB
C TRACH RRNA SPEC QL NAA+PROBE: NOT DETECTED
CYTOLOGY CVX/VAG DOC THIN PREP: NORMAL
HBV SURFACE AG SER QL: NONREACTIVE
HCV AB SER QL: NONREACTIVE
HCV S/CO RATIO: 0.15 S/CO
HPV HIGH+LOW RISK DNA PNL CVX: NOT DETECTED
N GONORRHOEA RRNA SPEC QL NAA+PROBE: NOT DETECTED
SOURCE TP AMPLIFICATION: NORMAL

## 2022-02-17 RX ORDER — NORETHINDRONE 0.35 MG/1
0.35 TABLET ORAL DAILY
Qty: 3 | Refills: 1 | Status: ACTIVE | COMMUNITY
Start: 2021-08-02 | End: 1900-01-01

## 2022-03-03 ENCOUNTER — TRANSCRIPTION ENCOUNTER (OUTPATIENT)
Age: 37
End: 2022-03-03

## 2023-01-10 ENCOUNTER — NON-APPOINTMENT (OUTPATIENT)
Age: 38
End: 2023-01-10

## 2023-01-10 ENCOUNTER — APPOINTMENT (OUTPATIENT)
Dept: OBGYN | Facility: CLINIC | Age: 38
End: 2023-01-10
Payer: SELF-PAY

## 2023-01-10 VITALS
DIASTOLIC BLOOD PRESSURE: 74 MMHG | BODY MASS INDEX: 27.88 KG/M2 | HEIGHT: 60 IN | WEIGHT: 142 LBS | SYSTOLIC BLOOD PRESSURE: 109 MMHG

## 2023-01-10 DIAGNOSIS — N63.0 UNSPECIFIED LUMP IN UNSPECIFIED BREAST: ICD-10-CM

## 2023-01-10 DIAGNOSIS — Z01.419 ENCOUNTER FOR GYNECOLOGICAL EXAMINATION (GENERAL) (ROUTINE) W/OUT ABNORMAL FINDINGS: ICD-10-CM

## 2023-01-10 DIAGNOSIS — R92.2 INCONCLUSIVE MAMMOGRAM: ICD-10-CM

## 2023-01-10 DIAGNOSIS — Z30.41 ENCOUNTER FOR SURVEILLANCE OF CONTRACEPTIVE PILLS: ICD-10-CM

## 2023-01-10 PROCEDURE — 99395 PREV VISIT EST AGE 18-39: CPT

## 2023-01-10 RX ORDER — NORETHINDRONE 0.35 MG/1
0.35 TABLET ORAL
Qty: 1 | Refills: 3 | Status: ACTIVE | COMMUNITY
Start: 2023-01-10 | End: 1900-01-01

## 2023-01-13 LAB — HPV HIGH+LOW RISK DNA PNL CVX: NOT DETECTED

## 2023-01-18 ENCOUNTER — OUTPATIENT (OUTPATIENT)
Dept: OUTPATIENT SERVICES | Facility: HOSPITAL | Age: 38
LOS: 1 days | End: 2023-01-18
Payer: COMMERCIAL

## 2023-01-18 ENCOUNTER — APPOINTMENT (OUTPATIENT)
Dept: MAMMOGRAPHY | Facility: IMAGING CENTER | Age: 38
End: 2023-01-18
Payer: COMMERCIAL

## 2023-01-18 ENCOUNTER — RESULT REVIEW (OUTPATIENT)
Age: 38
End: 2023-01-18

## 2023-01-18 ENCOUNTER — APPOINTMENT (OUTPATIENT)
Dept: ULTRASOUND IMAGING | Facility: IMAGING CENTER | Age: 38
End: 2023-01-18

## 2023-01-18 DIAGNOSIS — Z00.8 ENCOUNTER FOR OTHER GENERAL EXAMINATION: ICD-10-CM

## 2023-01-18 PROCEDURE — 77062 BREAST TOMOSYNTHESIS BI: CPT | Mod: 26

## 2023-01-18 PROCEDURE — 77066 DX MAMMO INCL CAD BI: CPT | Mod: 26

## 2023-01-18 PROCEDURE — 77066 DX MAMMO INCL CAD BI: CPT

## 2023-01-18 PROCEDURE — 76641 ULTRASOUND BREAST COMPLETE: CPT

## 2023-01-18 PROCEDURE — 76641 ULTRASOUND BREAST COMPLETE: CPT | Mod: 26,50

## 2023-01-18 PROCEDURE — G0279: CPT

## 2023-01-24 ENCOUNTER — APPOINTMENT (OUTPATIENT)
Dept: ULTRASOUND IMAGING | Facility: CLINIC | Age: 38
End: 2023-01-24

## 2023-01-24 ENCOUNTER — APPOINTMENT (OUTPATIENT)
Dept: MAMMOGRAPHY | Facility: CLINIC | Age: 38
End: 2023-01-24

## 2023-01-24 LAB — CYTOLOGY CVX/VAG DOC THIN PREP: NORMAL

## 2023-04-18 ENCOUNTER — ASOB RESULT (OUTPATIENT)
Age: 38
End: 2023-04-18

## 2023-04-18 ENCOUNTER — APPOINTMENT (OUTPATIENT)
Dept: OBGYN | Facility: CLINIC | Age: 38
End: 2023-04-18
Payer: COMMERCIAL

## 2023-04-18 VITALS — DIASTOLIC BLOOD PRESSURE: 88 MMHG | SYSTOLIC BLOOD PRESSURE: 143 MMHG

## 2023-04-18 DIAGNOSIS — N91.2 AMENORRHEA, UNSPECIFIED: ICD-10-CM

## 2023-04-18 LAB — HCG SERPL-MCNC: <1 MIU/ML

## 2023-04-18 PROCEDURE — 36415 COLL VENOUS BLD VENIPUNCTURE: CPT

## 2023-04-18 PROCEDURE — 99213 OFFICE O/P EST LOW 20 MIN: CPT | Mod: 25

## 2023-04-19 ENCOUNTER — NON-APPOINTMENT (OUTPATIENT)
Age: 38
End: 2023-04-19

## 2023-05-25 NOTE — PATIENT PROFILE OB - HEIGHT IN INCHES
0 Benzoyl Peroxide Pregnancy And Lactation Text: This medication is Pregnancy Category C. It is unknown if benzoyl peroxide is excreted in breast milk.

## 2024-04-11 RX ORDER — NORETHINDRONE ACETATE AND ETHINYL ESTRADIOL AND FERROUS FUMARATE 1MG-20(21)
1-20 KIT ORAL
Qty: 84 | Refills: 0 | Status: ACTIVE | COMMUNITY
Start: 2023-04-18 | End: 1900-01-01

## 2024-05-30 ENCOUNTER — APPOINTMENT (OUTPATIENT)
Dept: OBGYN | Facility: CLINIC | Age: 39
End: 2024-05-30

## 2024-08-23 DIAGNOSIS — Z12.31 ENCOUNTER FOR SCREENING MAMMOGRAM FOR MALIGNANT NEOPLASM OF BREAST: ICD-10-CM

## 2024-10-09 ENCOUNTER — APPOINTMENT (OUTPATIENT)
Dept: OBGYN | Facility: CLINIC | Age: 39
End: 2024-10-09

## 2024-11-06 ENCOUNTER — LABORATORY RESULT (OUTPATIENT)
Age: 39
End: 2024-11-06

## 2024-11-06 ENCOUNTER — NON-APPOINTMENT (OUTPATIENT)
Age: 39
End: 2024-11-06

## 2024-11-06 ENCOUNTER — APPOINTMENT (OUTPATIENT)
Dept: OBGYN | Facility: CLINIC | Age: 39
End: 2024-11-06
Payer: COMMERCIAL

## 2024-11-06 VITALS — SYSTOLIC BLOOD PRESSURE: 126 MMHG | HEIGHT: 60 IN | DIASTOLIC BLOOD PRESSURE: 83 MMHG

## 2024-11-06 PROCEDURE — 99396 PREV VISIT EST AGE 40-64: CPT

## 2024-11-06 RX ORDER — NORETHINDRONE ACETATE/ETHINYL ESTRADIOL AND FERROUS FUMARATE 1MG-20(21)
1-20 KIT ORAL
Qty: 3 | Refills: 3 | Status: ACTIVE | COMMUNITY
Start: 2024-11-06 | End: 1900-01-01

## 2024-11-07 LAB
C TRACH RRNA SPEC QL NAA+PROBE: NOT DETECTED
HBV SURFACE AG SER QL: NONREACTIVE
HCV AB SER QL: NONREACTIVE
HCV S/CO RATIO: 0.1 S/CO
HIV1+2 AB SPEC QL IA.RAPID: NONREACTIVE
N GONORRHOEA RRNA SPEC QL NAA+PROBE: NOT DETECTED
SOURCE AMPLIFICATION: NORMAL
T PALLIDUM AB SER QL IA: NEGATIVE

## 2024-11-12 ENCOUNTER — NON-APPOINTMENT (OUTPATIENT)
Age: 39
End: 2024-11-12

## 2024-11-12 LAB
CYTOLOGY CVX/VAG DOC THIN PREP: NORMAL
HPV HIGH+LOW RISK DNA PNL CVX: DETECTED

## 2025-05-28 ENCOUNTER — APPOINTMENT (OUTPATIENT)
Dept: ULTRASOUND IMAGING | Facility: IMAGING CENTER | Age: 40
End: 2025-05-28
Payer: COMMERCIAL

## 2025-05-28 ENCOUNTER — APPOINTMENT (OUTPATIENT)
Dept: MAMMOGRAPHY | Facility: IMAGING CENTER | Age: 40
End: 2025-05-28
Payer: COMMERCIAL

## 2025-05-28 ENCOUNTER — OUTPATIENT (OUTPATIENT)
Dept: OUTPATIENT SERVICES | Facility: HOSPITAL | Age: 40
LOS: 1 days | End: 2025-05-28
Payer: COMMERCIAL

## 2025-05-28 ENCOUNTER — RESULT REVIEW (OUTPATIENT)
Age: 40
End: 2025-05-28

## 2025-05-28 DIAGNOSIS — Z01.419 ENCOUNTER FOR GYNECOLOGICAL EXAMINATION (GENERAL) (ROUTINE) WITHOUT ABNORMAL FINDINGS: ICD-10-CM

## 2025-05-28 PROCEDURE — 77063 BREAST TOMOSYNTHESIS BI: CPT | Mod: 26

## 2025-05-28 PROCEDURE — 76641 ULTRASOUND BREAST COMPLETE: CPT | Mod: 26,50

## 2025-05-28 PROCEDURE — 77063 BREAST TOMOSYNTHESIS BI: CPT

## 2025-05-28 PROCEDURE — 77067 SCR MAMMO BI INCL CAD: CPT | Mod: 26

## 2025-05-28 PROCEDURE — 76641 ULTRASOUND BREAST COMPLETE: CPT

## 2025-05-28 PROCEDURE — 77067 SCR MAMMO BI INCL CAD: CPT

## 2025-05-30 DIAGNOSIS — R92.8 OTHER ABNORMAL AND INCONCLUSIVE FINDINGS ON DIAGNOSTIC IMAGING OF BREAST: ICD-10-CM

## 2025-05-30 DIAGNOSIS — Z91.89 OTHER SPECIFIED PERSONAL RISK FACTORS, NOT ELSEWHERE CLASSIFIED: ICD-10-CM

## 2025-06-04 ENCOUNTER — OUTPATIENT (OUTPATIENT)
Dept: OUTPATIENT SERVICES | Facility: HOSPITAL | Age: 40
LOS: 1 days | End: 2025-06-04
Payer: COMMERCIAL

## 2025-06-04 ENCOUNTER — APPOINTMENT (OUTPATIENT)
Dept: ULTRASOUND IMAGING | Facility: IMAGING CENTER | Age: 40
End: 2025-06-04
Payer: COMMERCIAL

## 2025-06-04 ENCOUNTER — RESULT REVIEW (OUTPATIENT)
Age: 40
End: 2025-06-04

## 2025-06-04 DIAGNOSIS — R92.8 OTHER ABNORMAL AND INCONCLUSIVE FINDINGS ON DIAGNOSTIC IMAGING OF BREAST: ICD-10-CM

## 2025-06-04 PROCEDURE — 76642 ULTRASOUND BREAST LIMITED: CPT

## 2025-06-04 PROCEDURE — 76642 ULTRASOUND BREAST LIMITED: CPT | Mod: 26,RT

## 2025-09-10 ENCOUNTER — TRANSCRIPTION ENCOUNTER (OUTPATIENT)
Age: 40
End: 2025-09-10